# Patient Record
Sex: MALE | Race: BLACK OR AFRICAN AMERICAN | NOT HISPANIC OR LATINO | Employment: UNEMPLOYED | URBAN - METROPOLITAN AREA
[De-identification: names, ages, dates, MRNs, and addresses within clinical notes are randomized per-mention and may not be internally consistent; named-entity substitution may affect disease eponyms.]

---

## 2017-07-12 ENCOUNTER — HOSPITAL ENCOUNTER (EMERGENCY)
Facility: OTHER | Age: 42
Discharge: HOME | End: 2017-07-12
Attending: INTERNAL MEDICINE
Payer: MEDICARE

## 2017-07-12 VITALS
BODY MASS INDEX: 31.14 KG/M2 | OXYGEN SATURATION: 98 % | RESPIRATION RATE: 18 BRPM | TEMPERATURE: 99 F | WEIGHT: 235 LBS | DIASTOLIC BLOOD PRESSURE: 107 MMHG | SYSTOLIC BLOOD PRESSURE: 169 MMHG | HEART RATE: 63 BPM | HEIGHT: 73 IN

## 2017-07-12 DIAGNOSIS — M10.9 ACUTE GOUT INVOLVING TOE OF RIGHT FOOT, UNSPECIFIED CAUSE: Primary | ICD-10-CM

## 2017-07-12 PROCEDURE — 63600175 PHARM REV CODE 636 W HCPCS: Performed by: INTERNAL MEDICINE

## 2017-07-12 PROCEDURE — 99283 EMERGENCY DEPT VISIT LOW MDM: CPT

## 2017-07-12 RX ORDER — PREDNISONE 20 MG/1
40 TABLET ORAL DAILY
Qty: 10 TABLET | Refills: 0 | Status: SHIPPED | OUTPATIENT
Start: 2017-07-12 | End: 2017-07-17

## 2017-07-12 RX ORDER — PREDNISONE 20 MG/1
60 TABLET ORAL
Status: COMPLETED | OUTPATIENT
Start: 2017-07-12 | End: 2017-07-12

## 2017-07-12 RX ORDER — ALLOPURINOL 300 MG/1
300 TABLET ORAL DAILY
Qty: 30 TABLET | Refills: 0 | Status: SHIPPED | OUTPATIENT
Start: 2017-07-12 | End: 2021-05-18

## 2017-07-12 RX ORDER — COLCHICINE 0.6 MG/1
1.2 TABLET ORAL ONCE
Qty: 2 TABLET | Refills: 0 | Status: SHIPPED | OUTPATIENT
Start: 2017-07-12 | End: 2024-02-20

## 2017-07-12 RX ORDER — PREDNISONE 20 MG/1
40 TABLET ORAL DAILY
Qty: 10 TABLET | Refills: 0 | Status: SHIPPED | OUTPATIENT
Start: 2017-07-12 | End: 2017-07-12

## 2017-07-12 RX ORDER — HYDROCODONE BITARTRATE AND ACETAMINOPHEN 5; 325 MG/1; MG/1
2 TABLET ORAL EVERY 8 HOURS PRN
Qty: 14 TABLET | Refills: 0 | Status: SHIPPED | OUTPATIENT
Start: 2017-07-12 | End: 2021-05-18

## 2017-07-12 RX ADMIN — PREDNISONE 60 MG: 20 TABLET ORAL at 11:07

## 2017-07-12 NOTE — ED PROVIDER NOTES
Encounter Date: 7/12/2017       History     Chief Complaint   Patient presents with    Gout     right foot great toe     42-year-old male presents to the emergency department complaining of right great toe pain from a gout exacerbation ×3 days.  States he has taken colchicine without relief.      The history is provided by the patient. No  was used.   Foot Injury    The incident occurred at home. There was no injury mechanism. Illness onset: 3 days ago. Pain location: Right great toe. The quality of the pain is described as aching. The pain is at a severity of 6/10. The pain has been constant since onset. Pertinent negatives include no numbness, no inability to bear weight, no loss of motion, no muscle weakness, no loss of sensation and no tingling. He reports no foreign bodies present. The symptoms are aggravated by activity, bearing weight and palpation. Treatments tried: Colchicine. The treatment provided mild relief.     Review of patient's allergies indicates:  No Known Allergies  Past Medical History:   Diagnosis Date    Gout, chronic     High cholesterol     Hypertension      Past Surgical History:   Procedure Laterality Date    CIRCUMCISION      LAPAROSCOPIC GASTRIC BANDING      2011     Family History   Problem Relation Age of Onset    Hypertension Mother     Diabetes Mother     Hypertension Father      Social History   Substance Use Topics    Smoking status: Never Smoker    Smokeless tobacco: Never Used    Alcohol use No     Review of Systems   Respiratory: Negative.    Cardiovascular: Negative.    Musculoskeletal:        Toe pain   Neurological: Negative for tingling and numbness.   All other systems reviewed and are negative.      Physical Exam     Initial Vitals [07/12/17 1038]   BP Pulse Resp Temp SpO2   (!) 166/104 63 18 97.2 °F (36.2 °C) 100 %      MAP       124.67         Physical Exam    Nursing note and vitals reviewed.  Constitutional: He appears well-developed and  well-nourished.   HENT:   Head: Normocephalic and atraumatic.   Eyes: EOM are normal.   Neck: Normal range of motion.   Cardiovascular: Normal rate, regular rhythm and intact distal pulses.   Abdominal: He exhibits no distension.   Musculoskeletal:   Right great toe tenderness to palpation, pain upon movement, and increased warmth   Neurological: He is alert. He has normal strength.   Skin: Skin is warm and dry.   Psychiatric: He has a normal mood and affect.         ED Course   Procedures  Labs Reviewed - No data to display          Medical Decision Making:   Initial Assessment:   42-year-old male presents to the emergency department complaining of right great toe pain from a gout exacerbation ×3 days.  States he has taken colchicine without relief.  Differential Diagnosis:   Gout exacerbation  Toe sprain  ED Management:  Patient was given instructions for gout exacerbation as well as a prescription for a prednisone burst and Norco.  He was advised to follow with his primary care physician tomorrow for reevaluation.                   ED Course     Clinical Impression:   The primary diagnosis is right great toe gout  Disposition:   Disposition: Discharged  Condition: Stable                        Michael Koenig MD  07/12/17 5020

## 2017-07-12 NOTE — ED TRIAGE NOTES
Patient reports gout flare up for 2-3 days, right great toe swelling and pain reported.  Patient reported taking colchicine today

## 2017-07-12 NOTE — ED NOTES
Patient has verified the spelling of their name and  on armband    LOC: The patient is awake, alert, and aware of environment with an appropriate affect, the patient is oriented x 4 and speaking appropriately.     APPEARANCE: Patient resting comfortably and in no acute distress, patient is clean and well groomed, patient's clothing is properly fastened.     RESPIRATORY: Airway is open and patent, respirations are spontaneous, patient has a normal effort and rate, no accessory muscle use noted, bilateral breath sounds clear, denies SOB     CARDIAC:  No chest pain, chest pressure or chest discomfort. No palpitations.     SKIN: Erythema noted to right great toe.     MUSCULOSKELETAL: +Swelling and erythema noted to right great toes. Bilateral pulses palpable.     COMPLAINT: Patient complain of right foot pain x 2 days.

## 2018-07-04 ENCOUNTER — HOSPITAL ENCOUNTER (EMERGENCY)
Facility: HOSPITAL | Age: 43
Discharge: HOME | End: 2018-07-04
Attending: EMERGENCY MEDICINE
Payer: MEDICARE

## 2018-07-04 VITALS
HEIGHT: 72 IN | RESPIRATION RATE: 16 BRPM | BODY MASS INDEX: 32.51 KG/M2 | SYSTOLIC BLOOD PRESSURE: 187 MMHG | DIASTOLIC BLOOD PRESSURE: 117 MMHG | WEIGHT: 240 LBS | OXYGEN SATURATION: 100 % | TEMPERATURE: 98 F | HEART RATE: 70 BPM

## 2018-07-04 DIAGNOSIS — R21 RASH: Primary | ICD-10-CM

## 2018-07-04 DIAGNOSIS — I10 HYPERTENSION, UNSPECIFIED TYPE: ICD-10-CM

## 2018-07-04 PROCEDURE — 25000003 PHARM REV CODE 250: Performed by: NURSE PRACTITIONER

## 2018-07-04 PROCEDURE — 63600175 PHARM REV CODE 636 W HCPCS: Performed by: NURSE PRACTITIONER

## 2018-07-04 PROCEDURE — 99283 EMERGENCY DEPT VISIT LOW MDM: CPT | Mod: 25 | Performed by: NURSE PRACTITIONER

## 2018-07-04 PROCEDURE — 96372 THER/PROPH/DIAG INJ SC/IM: CPT | Performed by: NURSE PRACTITIONER

## 2018-07-04 RX ORDER — DIPHENHYDRAMINE HCL 25 MG
25 CAPSULE ORAL
Status: COMPLETED | OUTPATIENT
Start: 2018-07-04 | End: 2018-07-04

## 2018-07-04 RX ORDER — CARVEDILOL 3.12 MG/1
3.12 TABLET ORAL
Status: COMPLETED | OUTPATIENT
Start: 2018-07-04 | End: 2018-07-04

## 2018-07-04 RX ORDER — HYDROCORTISONE 25 MG/G
CREAM TOPICAL
Status: COMPLETED | OUTPATIENT
Start: 2018-07-04 | End: 2018-07-04

## 2018-07-04 RX ORDER — HYDROCORTISONE 25 MG/G
CREAM TOPICAL 2 TIMES DAILY
Qty: 3.5 G | Refills: 0 | Status: SHIPPED | OUTPATIENT
Start: 2018-07-04 | End: 2024-02-20

## 2018-07-04 RX ORDER — AMLODIPINE BESYLATE 10 MG/1
10 TABLET ORAL DAILY
COMMUNITY
End: 2024-02-20 | Stop reason: SDUPTHER

## 2018-07-04 RX ORDER — FAMOTIDINE 20 MG/1
20 TABLET, FILM COATED ORAL
Status: COMPLETED | OUTPATIENT
Start: 2018-07-04 | End: 2018-07-04

## 2018-07-04 RX ORDER — METHYLPREDNISOLONE SOD SUCC 125 MG
125 VIAL (EA) INJECTION
Status: COMPLETED | OUTPATIENT
Start: 2018-07-04 | End: 2018-07-04

## 2018-07-04 RX ORDER — CARVEDILOL 3.12 MG/1
3.12 TABLET ORAL 2 TIMES DAILY WITH MEALS
COMMUNITY
End: 2021-05-18

## 2018-07-04 RX ORDER — AMLODIPINE BESYLATE 5 MG/1
10 TABLET ORAL
Status: COMPLETED | OUTPATIENT
Start: 2018-07-04 | End: 2018-07-04

## 2018-07-04 RX ADMIN — FAMOTIDINE 20 MG: 20 TABLET ORAL at 06:07

## 2018-07-04 RX ADMIN — METHYLPREDNISOLONE SODIUM SUCCINATE 125 MG: 125 INJECTION, POWDER, FOR SOLUTION INTRAMUSCULAR; INTRAVENOUS at 06:07

## 2018-07-04 RX ADMIN — DIPHENHYDRAMINE HYDROCHLORIDE 25 MG: 25 CAPSULE ORAL at 06:07

## 2018-07-04 RX ADMIN — CARVEDILOL 3.12 MG: 3.12 TABLET, FILM COATED ORAL at 06:07

## 2018-07-04 RX ADMIN — HYDROCORTISONE: 25 CREAM TOPICAL at 07:07

## 2018-07-04 RX ADMIN — AMLODIPINE BESYLATE 10 MG: 5 TABLET ORAL at 06:07

## 2018-07-04 NOTE — ED PROVIDER NOTES
Encounter Date: 7/4/2018  SORT MSE:  Pt is a 43 y.o. male who presents for emergent consideration for rash. Pt will be moved to room when one is available, otherwise will wait in waiting room with triage nurse supervision.  Pt arrived by ambulatory. He is not in distress. Orders have been placed. BETHANY Milner DNP ACN-BC 7/4/2018 6:15 PM     SCRIBE #1 NOTE: I, Boubacar Medrano, am scribing for, and in the presence of,  Akila Nicolas NP. I have scribed the following portions of the note - Other sections scribed: HPI, ROS.       History     Chief Complaint   Patient presents with    Rash     neck, back and chest x 3-4 days. Patient states he has tried to take benadryl and apply cortisone cream without relief. Patient states he thinks he is allergic to peanuts so he went ahead and ate more peanuts to see if that is what caused him to break out.     CC: Rash    HPI: 42 y/o male with medical hx of chronic gout, high cholesterol, and HTN presents to the ED c/o x4 day hx of a rash. Pt at first thought it was poison ivy, and began taking benadryl and using Cortizone cream. Pt reports that the rash was spreading moving from his neck to his back, chest, then arms. Pt then believed it may have been due to eating cashews. Pt reports eating cashews again with worsening of rash. Pt denies itching but feels like his skin is dry. Pt denies SOB, palpitations, chest pain, N/V/D, light headedness, sick contact, or any other associated symptoms.       The history is provided by the patient. No  was used.     Review of patient's allergies indicates:  No Known Allergies  Past Medical History:   Diagnosis Date    Gout, chronic     High cholesterol     Hypertension      Past Surgical History:   Procedure Laterality Date    CIRCUMCISION      LAPAROSCOPIC GASTRIC BANDING      2011     Family History   Problem Relation Age of Onset    Hypertension Mother     Diabetes Mother     Hypertension Father      Social  History   Substance Use Topics    Smoking status: Never Smoker    Smokeless tobacco: Never Used    Alcohol use No     Review of Systems   Constitutional: Negative for chills and fever.   HENT: Negative for congestion, ear pain, rhinorrhea and sore throat.    Eyes: Negative for pain.   Respiratory: Negative for cough and shortness of breath.    Cardiovascular: Negative for chest pain and palpitations.   Gastrointestinal: Negative for abdominal pain, diarrhea, nausea and vomiting.   Genitourinary: Negative for dysuria, flank pain, frequency, hematuria and urgency.   Musculoskeletal: Negative for back pain and neck pain.   Skin: Positive for rash (difuse).   Neurological: Negative for dizziness, weakness, light-headedness, numbness and headaches.   All other systems reviewed and are negative.      Physical Exam     Initial Vitals [07/04/18 1816]   BP Pulse Resp Temp SpO2   (!) 210/126 74 16 98.5 °F (36.9 °C) 98 %      MAP       --         Physical Exam    Nursing note and vitals reviewed.  Constitutional: Vital signs are normal. He appears well-developed and well-nourished. He is not diaphoretic. He is cooperative.  Non-toxic appearance. He does not have a sickly appearance. He does not appear ill. No distress.   HENT:   Head: Normocephalic and atraumatic.   Right Ear: External ear normal.   Left Ear: External ear normal.   Nose: Nose normal.   Mouth/Throat: Oropharynx is clear and moist. No oropharyngeal exudate.   Eyes: Conjunctivae and EOM are normal. Pupils are equal, round, and reactive to light.   Neck: Normal range of motion and full passive range of motion without pain. Neck supple.   Cardiovascular: Normal rate, regular rhythm, normal heart sounds, intact distal pulses and normal pulses. Exam reveals no decreased pulses.    Pulmonary/Chest: Effort normal and breath sounds normal. No respiratory distress. He has no decreased breath sounds.   Abdominal: Soft. Bowel sounds are normal. There is no tenderness.  There is no rigidity, no rebound, no guarding and no CVA tenderness.   Musculoskeletal: Normal range of motion.   Lymphadenopathy:        Head (right side): No tonsillar adenopathy present.        Head (left side): No tonsillar adenopathy present.     He has no cervical adenopathy.   Neurological: He is alert and oriented to person, place, and time. He has normal strength. GCS eye subscore is 4. GCS verbal subscore is 5. GCS motor subscore is 6.   Skin: Skin is warm, dry and intact. Capillary refill takes less than 2 seconds. Rash noted.        Patient has small red raised papular rash to the left lateral neck, left upper chest, bilateral forearms.  Patient reports this is not urticarial at this time.  Does not appear to be an allergic reaction.  No evidence of hives or wheals.  No rashes noted to palm sore soles of feet.  No airway involvement, patient speaks in clear sentences with ease, oxygen saturation 100% on room air, patient is not tachypneic.  Patient is hypertensive however states he has not taken his blood pressure medication today.  Patient was administered his blood pressure medication in the emergency department however he declined to allow us to further treat him as he states he wants to go home for the holiday.  Patient is asymptomatic with this blood pressure.   Psychiatric: He has a normal mood and affect. His behavior is normal. Judgment and thought content normal.         ED Course   Procedures  Labs Reviewed - No data to display       Imaging Results    None                APC / Resident Notes:   This is an evaluation of a 43 y.o. male that presents to the Emergency Department for rash.  The patient is a non-toxic, afebrile, and well appearing male. On physical exam, there is a blanching, diffuse, dry, localized, papular and red rash, that blanches, . There are no oral mucosa lesions, lesions in the webspace's of the fingers or toes, lesions on the palms or soles, vesicular lesions,  desquamation, or sloughing of the skin. No herald patch or satellite lesions. It does not appear fungal.Patient has small red raised papular rash to the left lateral neck, left upper chest, bilateral forearms.  No airway involvement, patient speaks in clear sentences with ease, oxygen saturation 100% on room air, patient is not tachypneic.  Patient is hypertensive however states he has not taken his blood pressure medication today.  Patient was administered his blood pressure medication in the emergency department however he declined to allow us to further treat him as he states he wants to go home for the holiday.  Patient is asymptomatic with this blood pressure.  Vital Signs are Reassuring.       Given the above findings, my overall impression is rash. Given the above findings, I do not think the patients rash is a result of Hand, Foot, and Mouth, Scabies, Shingles, Chicken Pox, meningococcemia, TENs, or SJS.     ED Meds:  Hydrocortisone 2.5, methylprednisone, diphenhydramine, Pepcid, amlodipine, carvedilol. DC Meds:  Hydrocortisone Additional recommendations:  Continue to take Benadryl as needed, use hydrocortisone with sparingly, skin discoloration warning given follow up with her PCP.. The diagnosis, treatment plan, instructions for follow-up and reevaluation with PCP as well as ED return precautions have been discussed and an understanding has been verbalized. All questions or concerns from the patient have been addressed.     This case was discussed with and the patient has been examined by Dr. quevedo who is in agreement with my assessment and plan.           Scribe Attestation:   Scribe #1: I performed the above scribed service and the documentation accurately describes the services I performed. I attest to the accuracy of the note.    Attending Attestation:           Physician Attestation for Scribe:  Physician Attestation Statement for Scribe #1: I, Akila Nicolas NP, reviewed documentation, as scribed  by Boubacar Medrano in my presence, and it is both accurate and complete.                    Clinical Impression:   The primary encounter diagnosis was Rash. A diagnosis of Hypertension, unspecified type was also pertinent to this visit.      Disposition:   Disposition: Discharged  Condition: Stable                        Akila Nicolas NP  07/04/18 1952

## 2018-07-04 NOTE — ED TRIAGE NOTES
Pt c/o rash x4 days. Rash is on neck, chest, back, bilat arms. Denies rash below waist. Pt states he believes he may be allergic to peanuts or cashews. States he ate some 4 days ago when the rash first appeared and then ate some again today and noticed the rash spread. Denies SOB or difficulty breathing. Pt has been taking benedryl and applying cortisone cream to no relief. Denies taking meds PTA

## 2018-07-05 NOTE — DISCHARGE INSTRUCTIONS
You have been given an appointment caught hydrocortisone.  You you need to use this on a rash 2 times daily.  Please do not exceed using this for more than 3 days.  Please do not use this on your face as this will cause discoloration of the skin.   You will need to follow up with her primary care physician as soon as possible for continued care and management.  You may also continue to take Benadryl every 6 hr as needed for itching.  Please remember to take your blood pressure medicine daily.  Please keep a log of her blood pressures at home to take to her primary care physician.    Please return to the Emergency Department for any new or worsening symptoms including: fever, chest pain, shortness of breath, loss of consciousness, dizziness, weakness, or any other concerns.     Please follow up with your Primary Care Provider within in the week. If you do not have one, you may contact the one listed on your discharge paperwork or you may also call the [facility] Appointment Desk at [phone] to schedule an appointment with one.     Please take all medication as prescribed.

## 2019-05-05 ENCOUNTER — HOSPITAL ENCOUNTER (EMERGENCY)
Facility: HOSPITAL | Age: 44
Discharge: HOME | End: 2019-05-05
Attending: EMERGENCY MEDICINE
Payer: MEDICARE

## 2019-05-05 VITALS
OXYGEN SATURATION: 98 % | RESPIRATION RATE: 18 BRPM | DIASTOLIC BLOOD PRESSURE: 71 MMHG | HEIGHT: 72 IN | SYSTOLIC BLOOD PRESSURE: 130 MMHG | WEIGHT: 240 LBS | TEMPERATURE: 99 F | BODY MASS INDEX: 32.51 KG/M2 | HEART RATE: 81 BPM

## 2019-05-05 DIAGNOSIS — R51.9 ACUTE NONINTRACTABLE HEADACHE, UNSPECIFIED HEADACHE TYPE: ICD-10-CM

## 2019-05-05 DIAGNOSIS — J01.90 ACUTE SINUSITIS, RECURRENCE NOT SPECIFIED, UNSPECIFIED LOCATION: Primary | ICD-10-CM

## 2019-05-05 DIAGNOSIS — H92.01 RIGHT EAR PAIN: ICD-10-CM

## 2019-05-05 DIAGNOSIS — H65.91 RIGHT OTITIS MEDIA WITH EFFUSION: ICD-10-CM

## 2019-05-05 DIAGNOSIS — J02.9 PHARYNGITIS, UNSPECIFIED ETIOLOGY: ICD-10-CM

## 2019-05-05 LAB — DEPRECATED S PYO AG THROAT QL EIA: NEGATIVE

## 2019-05-05 PROCEDURE — 99284 EMERGENCY DEPT VISIT MOD MDM: CPT

## 2019-05-05 PROCEDURE — 87880 STREP A ASSAY W/OPTIC: CPT

## 2019-05-05 PROCEDURE — 25000003 PHARM REV CODE 250: Performed by: NURSE PRACTITIONER

## 2019-05-05 PROCEDURE — 87081 CULTURE SCREEN ONLY: CPT

## 2019-05-05 RX ORDER — FLUTICASONE PROPIONATE 50 MCG
1 SPRAY, SUSPENSION (ML) NASAL 2 TIMES DAILY PRN
Qty: 15 G | Refills: 0 | Status: SHIPPED | OUTPATIENT
Start: 2019-05-05 | End: 2021-05-18

## 2019-05-05 RX ORDER — ACETAMINOPHEN 325 MG/1
650 TABLET ORAL
Status: COMPLETED | OUTPATIENT
Start: 2019-05-05 | End: 2019-05-05

## 2019-05-05 RX ORDER — LORATADINE 10 MG/1
10 TABLET ORAL DAILY
Qty: 14 TABLET | Refills: 0 | Status: SHIPPED | OUTPATIENT
Start: 2019-05-05 | End: 2024-02-20

## 2019-05-05 RX ADMIN — LIDOCAINE HYDROCHLORIDE 15 ML: 20 SOLUTION ORAL; TOPICAL at 07:05

## 2019-05-05 RX ADMIN — ACETAMINOPHEN 650 MG: 325 TABLET, FILM COATED ORAL at 07:05

## 2019-05-06 NOTE — DISCHARGE INSTRUCTIONS
Please return to the Emergency Department for any new or worsening symptoms including: worsening pain in the ear, head or throat;  fever, chest pain, shortness of breath, loss of consciousness, dizziness, weakness, or any other concerns.     Please follow up with your Primary Care Provider within in the week. If you do not have one, you may contact the one listed on your discharge paperwork or you may also call the [facility] Appointment Desk at [phone] to schedule an appointment with one.     Please take all medication as prescribed.

## 2019-05-06 NOTE — ED PROVIDER NOTES
Encounter Date: 5/5/2019       History     Chief Complaint   Patient presents with    Otalgia     right ear pain, throat pain, and HA x 3 DAYS; denies n/v/d, fever or chills at this time; took amoxicillin last night; denies taking anything for pain    Sore Throat     CC: Right Ear Pain, Sore Throat, Headache    HPI: Temo Braney, a 43 y.o. male presents to the ED gradual onset of right ear pain, sore throat, and right-sided headache that has been ongoing for last 2-3 days.  He reports occasional episodes of congestion.  He reports occasional nonproductive cough.  He reports no fevers, difficulty breathing or difficulty swallowing. He attempted treatment with an amoxicillin yesterday with no changes in his symptoms. Reports no vision changes, weakness, numbness, or other issue.          The history is provided by the patient. No  was used.     Review of patient's allergies indicates:  No Known Allergies  Past Medical History:   Diagnosis Date    Gout, chronic     High cholesterol     Hypertension      Past Surgical History:   Procedure Laterality Date    CIRCUMCISION      ESOPHAGOGASTRODUODENOSCOPY (EGD) N/A 11/21/2016    Performed by Moe [facility]enport [facility] at [facility]    LAPAROSCOPIC GASTRIC BANDING      2011     Family History   Problem Relation Age of Onset    Hypertension Mother     Diabetes Mother     Hypertension Father      Social History     Tobacco Use    Smoking status: Never Smoker    Smokeless tobacco: Never Used   Substance Use Topics    Alcohol use: No    Drug use: No     Review of Systems   Constitutional: Negative for fever.   HENT: Positive for congestion, ear pain (Right), rhinorrhea and sore throat. Negative for trouble swallowing.    Respiratory: Positive for cough (Intermittent).    Gastrointestinal: Negative for abdominal pain and vomiting.   Musculoskeletal: Negative for back pain, neck pain and neck stiffness.   Skin: Negative for rash and wound.    Neurological: Positive for headaches (Right Frontal). Negative for seizures, syncope, weakness and numbness.   Psychiatric/Behavioral: Negative for confusion.       Physical Exam     Initial Vitals [05/05/19 1856]   BP Pulse Resp Temp SpO2   131/79 82 17 99.2 °F (37.3 °C) 98 %      MAP       --         Physical Exam    Nursing note and vitals reviewed.  Constitutional: He appears well-developed and well-nourished. He is not diaphoretic. He is cooperative.  Non-toxic appearance. He does not have a sickly appearance. He does not appear ill. No distress.   HENT:   Head: Normocephalic and atraumatic.   Right Ear: External ear normal. Tympanic membrane is not erythematous. A middle ear effusion is present.   Left Ear: External ear normal. Tympanic membrane is not erythematous.  No middle ear effusion.   Nose: Mucosal edema and rhinorrhea present.   Mouth/Throat: Uvula is midline and mucous membranes are normal. No trismus in the jaw. Posterior oropharyngeal erythema (mild) present. No oropharyngeal exudate, posterior oropharyngeal edema or tonsillar abscesses.   Rhinorrhea with boggy nasal mucosa and posterior pharyngeal cobblestoning.   Eyes: Conjunctivae and EOM are normal.   Neck: Trachea normal, normal range of motion and phonation normal. Neck supple. Normal range of motion present. No neck rigidity.   Cardiovascular: Normal rate and regular rhythm.   Pulses:       Radial pulses are 2+ on the right side, and 2+ on the left side.   Pulmonary/Chest: Effort normal. No tachypnea and no bradypnea. No respiratory distress. He has no wheezes. He has no rhonchi. He has no rales.   Musculoskeletal: Normal range of motion.   Lymphadenopathy:     He has no cervical adenopathy.        Right cervical: No superficial cervical adenopathy present.       Left cervical: No superficial cervical adenopathy present.   Neurological: He is alert and oriented to person, place, and time. He has normal strength. Coordination and gait  normal. GCS eye subscore is 4. GCS verbal subscore is 5. GCS motor subscore is 6.   Skin: Skin is warm, dry and intact. No bruising and no rash noted. No erythema.         ED Course   Procedures  Labs Reviewed   THROAT SCREEN, RAPID   CULTURE, STREP A,  THROAT          Imaging Results    None                APC / Resident Notes:   This is an evaluation of a 43 y.o. male that presents to the Emergency Department for right ear pain, sore throat, and right sided frontal headache. Reports headache is gradual onset. Attempted treatment with 1 amoxil left over without improvement. The patient is a non-toxic, afebrile, and well appearing male. On physical exam ears are without evidence of infection, there is a middle ear effusion of the right ear. Mild pharyngeal erythema without exudates.  Appears well hydrated with moist mucus membranes. Neck soft and supple with no meningeal signs or cervical lymphadenopathy. Breath sounds are clear and equal bilaterally with no adventitious breath sounds, tachypnea or respiratory distress with room air pulse ox of 98% and no evidence of hypoxia. Rhinorrhea with boggy mucosa. Neuro intact. Vital Signs Are Reassuring. RESULTS: Strep negative.     My overall impression is Sinusitis with right OME and pharyngitis - given the length of symptoms and lack of fever, likely viral in nature. I considered, but at this time, do not suspect OM, OE, strep pharyngitis, meningitis, pneumonia, or acute bacterial sinusitis.    D/C Meds: Flonase, Claritin. Additional D/C Information: Hydration, Tylenol PRN. The diagnosis, treatment plan, instructions for follow-up and reevaluation with PCP as well as ED return precautions were discussed and understanding was verbalized. All questions or concerns have been addressed. MIRANDA Covarrubias, FNP-C                  Clinical Impression:       ICD-10-CM ICD-9-CM   1. Acute sinusitis, recurrence not specified, unspecified location J01.90 461.9   2. Right ear pain  H92.01 388.70   3. Pharyngitis, unspecified etiology J02.9 462   4. Acute nonintractable headache, unspecified headache type R51 784.0   5. Right otitis media with effusion H65.91 381.4         Disposition:   Disposition: Discharged  Condition: Stable                        Sagar Mcmahan, [facility]  05/05/19 9060

## 2019-05-07 LAB — BACTERIA THROAT CULT: NORMAL

## 2019-09-30 ENCOUNTER — HOSPITAL ENCOUNTER (EMERGENCY)
Facility: HOSPITAL | Age: 44
Discharge: HOME | End: 2019-09-30
Attending: EMERGENCY MEDICINE
Payer: MEDICARE

## 2019-09-30 VITALS
HEART RATE: 89 BPM | OXYGEN SATURATION: 98 % | DIASTOLIC BLOOD PRESSURE: 115 MMHG | TEMPERATURE: 98 F | BODY MASS INDEX: 32.51 KG/M2 | RESPIRATION RATE: 16 BRPM | WEIGHT: 240 LBS | HEIGHT: 72 IN | SYSTOLIC BLOOD PRESSURE: 204 MMHG

## 2019-09-30 DIAGNOSIS — M79.5 FOREIGN BODY (FB) IN SOFT TISSUE: ICD-10-CM

## 2019-09-30 DIAGNOSIS — S01.81XA FACIAL LACERATION, INITIAL ENCOUNTER: Primary | ICD-10-CM

## 2019-09-30 PROCEDURE — 25000003 PHARM REV CODE 250: Performed by: NURSE PRACTITIONER

## 2019-09-30 PROCEDURE — 12051 INTMD RPR FACE/MM 2.5 CM/<: CPT

## 2019-09-30 PROCEDURE — 96360 HYDRATION IV INFUSION INIT: CPT

## 2019-09-30 PROCEDURE — 99284 EMERGENCY DEPT VISIT MOD MDM: CPT | Mod: 25

## 2019-09-30 PROCEDURE — 63600175 PHARM REV CODE 636 W HCPCS: Performed by: EMERGENCY MEDICINE

## 2019-09-30 RX ORDER — LIDOCAINE HYDROCHLORIDE AND EPINEPHRINE 10; 10 MG/ML; UG/ML
10 INJECTION, SOLUTION INFILTRATION; PERINEURAL
Status: COMPLETED | OUTPATIENT
Start: 2019-09-30 | End: 2019-09-30

## 2019-09-30 RX ORDER — CEPHALEXIN 500 MG/1
500 CAPSULE ORAL 4 TIMES DAILY
Qty: 20 CAPSULE | Refills: 0 | Status: SHIPPED | OUTPATIENT
Start: 2019-09-30 | End: 2019-10-05

## 2019-09-30 RX ADMIN — LIDOCAINE HYDROCHLORIDE AND EPINEPHRINE 10 ML: 10; 10 INJECTION, SOLUTION INFILTRATION; PERINEURAL at 12:09

## 2019-09-30 RX ADMIN — SODIUM CHLORIDE 2000 ML: 0.9 INJECTION, SOLUTION INTRAVENOUS at 12:09
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
## 2019-09-30 NOTE — DISCHARGE INSTRUCTIONS
Thank you for coming to our Emergency Department today. It is important to remember that some problems are difficult to diagnose and may not be found during your first visit. Be sure to follow up with your primary care doctor and review any labs/imaging that was performed with them. If you do not have a primary care doctor, you may contact the one listed on your discharge paperwork or you may also call the [facility] Appointment Desk at [phone] to schedule an appointment with one.     All medications may potentially have side effects and it is impossible to predict which medications may give you side effects. If you feel that you are having a negative effect of any medication you should immediately stop taking them and seek medical attention.    Return to the ER with any questions/concerns, new/concerning symptoms, worsening or failure to improve. Do not drive or make any important decisions for 24 hours if you have received any pain medications, sedatives or mood altering drugs during your ER visit.
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
## 2022-09-02 ENCOUNTER — HOSPITAL ENCOUNTER (EMERGENCY)
Facility: HOSPITAL | Age: 47
Discharge: HOME | End: 2022-09-02
Attending: EMERGENCY MEDICINE
Payer: MEDICARE

## 2022-09-02 VITALS
TEMPERATURE: 98 F | HEIGHT: 73 IN | DIASTOLIC BLOOD PRESSURE: 96 MMHG | WEIGHT: 245 LBS | BODY MASS INDEX: 32.47 KG/M2 | SYSTOLIC BLOOD PRESSURE: 154 MMHG | RESPIRATION RATE: 16 BRPM | OXYGEN SATURATION: 99 % | HEART RATE: 63 BPM

## 2022-09-02 DIAGNOSIS — M43.6 TORTICOLLIS, ACUTE: Primary | ICD-10-CM

## 2022-09-02 PROCEDURE — 99284 EMERGENCY DEPT VISIT MOD MDM: CPT

## 2022-09-02 PROCEDURE — 63600175 PHARM REV CODE 636 W HCPCS: Performed by: EMERGENCY MEDICINE

## 2022-09-02 PROCEDURE — 96372 THER/PROPH/DIAG INJ SC/IM: CPT | Performed by: EMERGENCY MEDICINE

## 2022-09-02 RX ORDER — DEXAMETHASONE SODIUM PHOSPHATE 4 MG/ML
8 INJECTION, SOLUTION INTRA-ARTICULAR; INTRALESIONAL; INTRAMUSCULAR; INTRAVENOUS; SOFT TISSUE
Status: COMPLETED | OUTPATIENT
Start: 2022-09-02 | End: 2022-09-02

## 2022-09-02 RX ORDER — HYDROMORPHONE HYDROCHLORIDE 1 MG/ML
1 INJECTION, SOLUTION INTRAMUSCULAR; INTRAVENOUS; SUBCUTANEOUS
Status: COMPLETED | OUTPATIENT
Start: 2022-09-02 | End: 2022-09-02

## 2022-09-02 RX ORDER — METHOCARBAMOL 500 MG/1
1000 TABLET, FILM COATED ORAL 3 TIMES DAILY
Qty: 30 TABLET | Refills: 0 | Status: SHIPPED | OUTPATIENT
Start: 2022-09-02 | End: 2022-09-07

## 2022-09-02 RX ORDER — HYDROMORPHONE HYDROCHLORIDE 1 MG/ML
2 INJECTION, SOLUTION INTRAMUSCULAR; INTRAVENOUS; SUBCUTANEOUS
Status: DISCONTINUED | OUTPATIENT
Start: 2022-09-02 | End: 2022-09-02

## 2022-09-02 RX ORDER — ORPHENADRINE CITRATE 30 MG/ML
60 INJECTION INTRAMUSCULAR; INTRAVENOUS
Status: COMPLETED | OUTPATIENT
Start: 2022-09-02 | End: 2022-09-02

## 2022-09-02 RX ORDER — NAPROXEN 500 MG/1
500 TABLET ORAL 2 TIMES DAILY WITH MEALS
Qty: 30 TABLET | Refills: 0 | Status: SHIPPED | OUTPATIENT
Start: 2022-09-02 | End: 2024-02-20

## 2022-09-02 RX ADMIN — ORPHENADRINE CITRATE 60 MG: 60 INJECTION INTRAMUSCULAR; INTRAVENOUS at 03:09

## 2022-09-02 RX ADMIN — HYDROMORPHONE HYDROCHLORIDE 1 MG: 1 INJECTION, SOLUTION INTRAMUSCULAR; INTRAVENOUS; SUBCUTANEOUS at 03:09

## 2022-09-02 RX ADMIN — DEXAMETHASONE SODIUM PHOSPHATE 8 MG: 4 INJECTION, SOLUTION INTRA-ARTICULAR; INTRALESIONAL; INTRAMUSCULAR; INTRAVENOUS; SOFT TISSUE at 03:09

## 2022-09-02 NOTE — ED PROVIDER NOTES
Encounter Date: 9/2/2022       History     Chief Complaint   Patient presents with    Torticollis     X 24 hrs, Rt side     47-year-old male presented emergency department with pain in the right side of the neck and having pain when he tries to turn the head to the right.  Patient woke up with this pain yesterday.  Denies any fever or headache or nausea vomiting.  Denies any trauma.  Denies any midline neck tenderness    Review of patient's allergies indicates:  No Known Allergies  Past Medical History:   Diagnosis Date    Gout, chronic     High cholesterol     Hypertension      Past Surgical History:   Procedure Laterality Date    CIRCUMCISION      LAPAROSCOPIC GASTRIC BANDING      2011     Family History   Problem Relation Age of Onset    Hypertension Mother     Diabetes Mother     Hypertension Father      Social History     Tobacco Use    Smoking status: Never    Smokeless tobacco: Never   Substance Use Topics    Alcohol use: No    Drug use: No     Review of Systems   Constitutional: Negative.    HENT: Negative.     Eyes: Negative.    Respiratory: Negative.     Cardiovascular: Negative.    Gastrointestinal: Negative.    Endocrine: Negative.    Genitourinary: Negative.    Musculoskeletal:  Positive for neck pain.   Skin: Negative.    Allergic/Immunologic: Negative.    Neurological: Negative.    Hematological: Negative.    Psychiatric/Behavioral: Negative.     All other systems reviewed and are negative.    Physical Exam     Initial Vitals [09/02/22 0255]   BP Pulse Resp Temp SpO2   (!) 149/109 65 15 97.5 °F (36.4 °C) 99 %      MAP       --         Physical Exam    Nursing note and vitals reviewed.  Constitutional: He appears well-developed and well-nourished.   HENT:   Head: Normocephalic and atraumatic.   Nose: Nose normal.   Eyes: Conjunctivae and EOM are normal.   Neck: No tracheal deviation present.   Normal range of motion.  Cardiovascular:  Normal rate, regular rhythm, normal heart sounds and  intact distal pulses.     Exam reveals no friction rub.       No murmur heard.  Pulmonary/Chest: Breath sounds normal. No respiratory distress. He has no wheezes. He has no rales.   Abdominal: Abdomen is soft. He exhibits no distension. There is no abdominal tenderness.   Musculoskeletal:         General: Tenderness present. Normal range of motion.      Cervical back: Normal range of motion.      Comments: Right sternocleidomastoids muscle tenderness and stiffness.  No midline cervical spine tenderness     Neurological: He is alert and oriented to person, place, and time. He has normal strength. GCS score is 15. GCS eye subscore is 4. GCS verbal subscore is 5. GCS motor subscore is 6.   Skin: Skin is warm and dry. Capillary refill takes less than 2 seconds.   Psychiatric: He has a normal mood and affect. Thought content normal.       ED Course   Procedures  Labs Reviewed - No data to display       Imaging Results    None          Medications   HYDROmorphone injection 2 mg (has no administration in time range)   orphenadrine injection 60 mg (has no administration in time range)   dexamethasone injection 8 mg (has no administration in time range)     Medical Decision Making:   Differential Diagnosis:   Patient has neck stiffness consistent with torticollis which started when he woke from sleep yesterday.  Patient's pain musculoskeletal in nature and treated in the emergency department and discharged with instructions and follow-up.  Patient otherwise neurologically intact                    Clinical Impression:   Final diagnoses:  [M43.6] Torticollis, acute (Primary)        ED Disposition Condition    Discharge Stable          ED Prescriptions       Medication Sig Dispense Start Date End Date Auth. Provider    naproxen (NAPROSYN) 500 MG tablet Take 1 tablet (500 mg total) by mouth 2 (two) times daily with meals. 30 tablet 9/2/2022 -- Veronica Pascual MD    methocarbamoL (ROBAXIN) 500 MG Tab Take 2 tablets (1,000 mg total)  by mouth 3 (three) times daily. for 5 days 30 tablet 9/2/2022 9/7/2022 Veronica Pascual MD          Follow-up Information       Follow up With Specialties Details Why Contact Info    El Galvan MD Internal Medicine In 2 days  [address] [facility] [place] [address]  [phone]               Veronica Pascual MD  09/02/22 4637

## 2022-09-02 NOTE — DISCHARGE INSTRUCTIONS
No bending or stooping or heavy weightlifting.  Rest.  Return to emergency department for worsening symptoms or any problems

## 2023-01-01 ENCOUNTER — HOSPITAL ENCOUNTER (EMERGENCY)
Facility: HOSPITAL | Age: 48
Discharge: HOME | End: 2023-01-01
Attending: EMERGENCY MEDICINE
Payer: MEDICARE

## 2023-01-01 VITALS
OXYGEN SATURATION: 100 % | DIASTOLIC BLOOD PRESSURE: 94 MMHG | HEART RATE: 70 BPM | TEMPERATURE: 98 F | HEIGHT: 73 IN | RESPIRATION RATE: 18 BRPM | BODY MASS INDEX: 33.13 KG/M2 | SYSTOLIC BLOOD PRESSURE: 147 MMHG | WEIGHT: 250 LBS

## 2023-01-01 DIAGNOSIS — R52 PAIN: ICD-10-CM

## 2023-01-01 DIAGNOSIS — S80.02XA CONTUSION OF LEFT KNEE, INITIAL ENCOUNTER: ICD-10-CM

## 2023-01-01 DIAGNOSIS — S20.211A CONTUSION OF RIB ON RIGHT SIDE, INITIAL ENCOUNTER: ICD-10-CM

## 2023-01-01 DIAGNOSIS — S90.02XA CONTUSION OF LEFT ANKLE, INITIAL ENCOUNTER: Primary | ICD-10-CM
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
## 2023-01-01 LAB
ALBUMIN SERPL BCP-MCNC: 4.3 G/DL (ref 3.5–5.2)
ALP SERPL-CCNC: 57 U/L (ref 55–135)
ALT SERPL W/O P-5'-P-CCNC: 28 U/L (ref 10–44)
ANION GAP SERPL CALC-SCNC: 5 MMOL/L (ref 8–16)
AST SERPL-CCNC: 25 U/L (ref 10–40)
BASOPHILS # BLD AUTO: 0.05 K/UL (ref 0–0.2)
BASOPHILS NFR BLD: 0.6 % (ref 0–1.9)
BILIRUB SERPL-MCNC: 0.7 MG/DL (ref 0.1–1)
BUN SERPL-MCNC: 24 MG/DL (ref 6–20)
CALCIUM SERPL-MCNC: 9 MG/DL (ref 8.7–10.5)
CHLORIDE SERPL-SCNC: 104 MMOL/L (ref 95–110)
CO2 SERPL-SCNC: 28 MMOL/L (ref 23–29)
CREAT SERPL-MCNC: 1.8 MG/DL (ref 0.5–1.4)
CREAT SERPL-MCNC: 2 MG/DL (ref 0.5–1.4)
DIFFERENTIAL METHOD: ABNORMAL
EOSINOPHIL # BLD AUTO: 0.2 K/UL (ref 0–0.5)
EOSINOPHIL NFR BLD: 2.7 % (ref 0–8)
ERYTHROCYTE [DISTWIDTH] IN BLOOD BY AUTOMATED COUNT: 14.9 % (ref 11.5–14.5)
EST. GFR  (NO RACE VARIABLE): 46.7 ML/MIN/1.73 M^2
GLUCOSE SERPL-MCNC: 87 MG/DL (ref 70–110)
HCT VFR BLD AUTO: 38.4 % (ref 40–54)
HGB BLD-MCNC: 13 G/DL (ref 14–18)
IMM GRANULOCYTES # BLD AUTO: 0.03 K/UL (ref 0–0.04)
IMM GRANULOCYTES NFR BLD AUTO: 0.3 % (ref 0–0.5)
LYMPHOCYTES # BLD AUTO: 1.7 K/UL (ref 1–4.8)
LYMPHOCYTES NFR BLD: 18.8 % (ref 18–48)
MCH RBC QN AUTO: 26.3 PG (ref 27–31)
MCHC RBC AUTO-ENTMCNC: 33.9 G/DL (ref 32–36)
MCV RBC AUTO: 78 FL (ref 82–98)
MONOCYTES # BLD AUTO: 0.6 K/UL (ref 0.3–1)
MONOCYTES NFR BLD: 6.1 % (ref 4–15)
NEUTROPHILS # BLD AUTO: 6.5 K/UL (ref 1.8–7.7)
NEUTROPHILS NFR BLD: 71.5 % (ref 38–73)
NRBC BLD-RTO: 0 /100 WBC
PLATELET # BLD AUTO: 274 K/UL (ref 150–450)
PMV BLD AUTO: 9.7 FL (ref 9.2–12.9)
POTASSIUM SERPL-SCNC: 3.8 MMOL/L (ref 3.5–5.1)
PROT SERPL-MCNC: 7.8 G/DL (ref 6–8.4)
RBC # BLD AUTO: 4.95 M/UL (ref 4.6–6.2)
SAMPLE: ABNORMAL
SODIUM SERPL-SCNC: 137 MMOL/L (ref 136–145)
WBC # BLD AUTO: 9.03 K/UL (ref 3.9–12.7)

## 2023-01-01 PROCEDURE — 85025 COMPLETE CBC W/AUTO DIFF WBC: CPT | Performed by: NURSE PRACTITIONER

## 2023-01-01 PROCEDURE — 96374 THER/PROPH/DIAG INJ IV PUSH: CPT

## 2023-01-01 PROCEDURE — 80053 COMPREHEN METABOLIC PANEL: CPT | Performed by: NURSE PRACTITIONER

## 2023-01-01 PROCEDURE — 99285 EMERGENCY DEPT VISIT HI MDM: CPT | Mod: 25

## 2023-01-01 PROCEDURE — 63600175 PHARM REV CODE 636 W HCPCS: Performed by: NURSE PRACTITIONER

## 2023-01-01 RX ORDER — MORPHINE SULFATE 4 MG/ML
4 INJECTION, SOLUTION INTRAMUSCULAR; INTRAVENOUS
Status: COMPLETED | OUTPATIENT
Start: 2023-01-01 | End: 2023-01-01

## 2023-01-01 RX ORDER — METHOCARBAMOL 500 MG/1
1000 TABLET, FILM COATED ORAL EVERY 8 HOURS PRN
Qty: 30 TABLET | Refills: 0 | Status: SHIPPED | OUTPATIENT
Start: 2023-01-01 | End: 2023-01-06

## 2023-01-01 RX ADMIN — MORPHINE SULFATE 4 MG: 4 INJECTION, SOLUTION INTRAMUSCULAR; INTRAVENOUS at 08:01

## 2023-01-02 NOTE — ED PROVIDER NOTES
Encounter Date: 1/1/2023       History     Chief Complaint   Patient presents with    Ankle Pain     LEFT, FELL THRU CEILING IN ATTIC. NO LOC    Knee Injury     LEFT    Hip Pain     LEFT    RT RIBS     RT SIDE RIBS     Temo Barney is a 47 year old male with pmh gout, HTN, high cholesterol presenting to the ED with c/o left knee and ankle pain, right anterior chest wall pain, and left hip pain. The patient states he was working in the attic when he stepped in an area that caused him to fall through the ceiling. He did not fall down to the ground but the left leg went through the ceiling. He struck his right chest and abdomen on a beam. He did not hit his head or have LOC.     Review of patient's allergies indicates:  No Known Allergies  Past Medical History:   Diagnosis Date    Gout, chronic     High cholesterol     Hypertension      Past Surgical History:   Procedure Laterality Date    CIRCUMCISION      LAPAROSCOPIC GASTRIC BANDING      2011     Family History   Problem Relation Age of Onset    Hypertension Mother     Diabetes Mother     Hypertension Father      Social History     Tobacco Use    Smoking status: Never    Smokeless tobacco: Never   Substance Use Topics    Alcohol use: No    Drug use: No     Review of Systems   Constitutional:  Negative for fever.   HENT:  Negative for sore throat.    Respiratory:  Negative for shortness of breath.    Cardiovascular:  Positive for chest pain (right chest wall pain).   Gastrointestinal:  Positive for abdominal pain. Negative for diarrhea, nausea and vomiting.   Genitourinary:  Negative for dysuria.   Musculoskeletal:  Positive for arthralgias (left knee, left ankle, left hip). Negative for back pain.   Skin:  Negative for rash.   Neurological:  Negative for weakness and headaches.   Hematological:  Does not bruise/bleed easily.     Physical Exam     Initial Vitals [01/01/23 1811]   BP Pulse Resp Temp SpO2   (!) 164/111 70 18 98.3 °F (36.8 °C) 100 %      MAP       --          Physical Exam    Nursing note and vitals reviewed.  Constitutional: He appears well-developed and well-nourished. He is not diaphoretic. No distress.   HENT:   Head: Normocephalic and atraumatic.   Mouth/Throat: Oropharynx is clear and moist.   Eyes: Conjunctivae are normal.   Neck: Neck supple.   Cardiovascular:  Normal rate, regular rhythm, normal heart sounds and intact distal pulses.     Exam reveals no gallop and no friction rub.       No murmur heard.  Pulmonary/Chest: Breath sounds normal. He has no wheezes. He has no rhonchi. He has no rales. He exhibits tenderness (right lateral chest wall).     Abdominal: Abdomen is soft. He exhibits no distension. There is abdominal tenderness (RUQ).   Musculoskeletal:      Cervical back: Neck supple.      Left hip: Tenderness present.      Left knee: Bony tenderness present. No deformity. Decreased range of motion. Normal pulse.      Left ankle: Swelling present. No deformity. Tenderness present over the lateral malleolus. Decreased range of motion (due to pain). Normal pulse.     Neurological: He is alert and oriented to person, place, and time.   Skin: No rash noted. No erythema.       ED Course   Procedures  Labs Reviewed   CBC W/ AUTO DIFFERENTIAL - Abnormal; Notable for the following components:       Result Value    Hemoglobin 13.0 (*)     Hematocrit 38.4 (*)     MCV 78 (*)     MCH 26.3 (*)     RDW 14.9 (*)     All other components within normal limits   COMPREHENSIVE METABOLIC PANEL - Abnormal; Notable for the following components:    BUN 24 (*)     Creatinine 1.8 (*)     Anion Gap 5 (*)     eGFR 46.7 (*)     All other components within normal limits   ISTAT CREATININE - Abnormal; Notable for the following components:    POC Creatinine 2.0 (*)     All other components within normal limits   POCT CREATININE          Imaging Results              CT Chest Abdomen Pelvis Without Contrast (XPD) (Final result)  Result time 01/01/23 22:10:09   Procedure changed  from CT Chest Abdomen Pelvis With Contrast (xpd)     Final result by Colten Del Toro MD (01/01/23 22:10:09)                   Narrative:    EXAM DESCRIPTION: CT CHEST ABDOMEN PELVIS WITHOUT CONTRAST(XPD)    CLINICAL INDICATION:  Polytrauma, blunt    TECHNIQUE: Axial CT imaging of the chest, abdomen and pelvis was performed. Sagittal and coronal reconstructions were performed for review.    CONTRAST: None    COMPARISON: Chest x-ray January 1, 2023.    FINDINGS:  CT chest: The esophagus is distended with fluid. This could represent chronic esophageal disease or be related to obstruction from the gastric band.    There are no pleural or pericardial effusions. Mild increased density in the anterior mediastinum is most consistent with residual thymus. No evidence of acute mediastinal injury.      The heart size is normal.    The lungs are clear and there is no pneumothorax.    No fracture is demonstrated.        CT abdomen/pelvis: Gastric band is present and grossly normal in position. No bowel injury demonstrated. Scattered diverticuli are present. The appendix is normal.    The liver, gallbladder and bile ducts, spleen, adrenal glands and pancreas are normal.    The kidneys are normal in appearance.    Aorta is mildly atherosclerotic and normal size.    No free fluid, masses or adenopathy.    The prostate gland is normal size. The bladder is normal in appearance.    There is bruising over the right abdominal wall.    No fractures are demonstrated.      IMPRESSION:  1: Bruising of the anterior abdominal wall consistent with injury.  2: Distended esophagus with fluid. Possibly related to the tightness of the gastric band.  3: No acute traumatic intrathoracic, intra-abdominal or pelvic finding.      RADIATION DOSE REDUCTION: All CT scans are performed using radiation dose reduction techniques, when applicable. Technical factors are evaluated and adjusted to ensure appropriate moderation of exposure.    Electronically  signed by:  Colten Del Toro MD  1/1/2023 10:10 PM CST Workstation: [id number]                                     X-Ray Tibia Fibula 2 View Left (Final result)  Result time 01/01/23 18:50:02      Final result by Elva Bourgeois DO (01/01/23 18:50:02)                   Narrative:    Left tibia/fibular radiographs: 1/1/2023 6:20 PM CST    COMPARISON: None available    TECHNIQUE: AP and lateral images of the left tibia and fibula were obtained.    History: 47 years  old Male with fall, pain.    FINDINGS: There are no findings to suggest an acute fracture or subluxation within the left tibia or fibula. The visualized soft tissues appear grossly unremarkable.    IMPRESSION: There are no findings to suggest an acute fracture or subluxation within the left tibia or fibula.    Electronically signed by:  Elva Bourgeois DO  1/1/2023 6:50 PM CST Workstation: [id number]                                     X-Ray Knee Complete 4 or more Views Left (Final result)  Result time 01/01/23 18:50:24   Procedure changed from X-Ray Knee 3 View Left     Final result by Jacqueline Cummins MD (01/01/23 18:50:24)                   Narrative:    Left knee 4 views    Clinical data: Pain    FINDINGS: 4 views are negative for fracture, dislocation, or osseous destructive lesion. There is mild joint space narrowing of the patellofemoral joint. There is spurring of the superior patella. Soft tissues are normal.    IMPRESSION: Mild degenerative changes of the patellofemoral joint with no acute osseous abnormality    Electronically signed by:  Jacqueline Cummins MD  1/1/2023 6:49 PM CST Workstation: [id number]                      Final result by Jacqueline Cummins MD (01/01/23 18:49:08)                   Narrative:    Left knee 4 views    Clinical data: Pain    FINDINGS: 4 views are negative for fracture, dislocation, or osseous destructive lesion. There is mild joint space narrowing of the patellofemoral joint. There is spurring of the superior  patella. Soft tissues are normal.    IMPRESSION: Mild degenerative changes of the patellofemoral joint with no acute osseous abnormality    Electronically signed by:  Jacqueline Cummins MD  1/1/2023 6:49 PM CST Workstation: [id number]                                     X-Ray Ankle Complete Left (Final result)  Result time 01/01/23 18:50:24      Final result by Jacqueline Cummins MD (01/01/23 18:50:24)                   Narrative:    Left knee 4 views    Clinical data: Pain    FINDINGS: 4 views are negative for fracture, dislocation, or osseous destructive lesion. There is mild joint space narrowing of the patellofemoral joint. There is spurring of the superior patella. Soft tissues are normal.    IMPRESSION: Mild degenerative changes of the patellofemoral joint with no acute osseous abnormality    Electronically signed by:  Jacqueline Cummins MD  1/1/2023 6:49 PM CST Workstation: [id number]                                     X-Ray Hip 2 or 3 views Left (with Pelvis when performed) (Final result)  Result time 01/01/23 18:49:18      Final result by Elva Bourgeois DO (01/01/23 18:49:18)                   Narrative:    Left hip and pelvic radiographs: 1/1/2023 6:49 PM CST    TECHNIQUE: AP pelvis; AP and lateral views of the left hip were obtained.    COMPARISON: None available    History: 47 years  old Male with PAIN.    FINDINGS: Both sacroiliac joints appear unremarkable.    There visualized portions of the lower lumbar spine are unremarkable.    There are no findings to suggest an acute fracture or subluxation within the left hip.    There are no findings to suggest an acute fracture of the pelvis.    IMPRESSION: There are no findings to suggest an acute fracture or subluxation of the left hip.  There are no findings to suggest an acute fracture within the pelvis or left hip.    Electronically signed by:  Elva Bourgeois DO  1/1/2023 6:49 PM CST Workstation: [id number]                                     XR Ribs  Min 3 Views w/PA Chest Right (Final result)  Result time 01/01/23 18:48:25      Final result by Elva Bourgeois DO (01/01/23 18:48:25)                   Narrative:    RIGHT RIB AND CHEST RADIOGRAPHS: 1/1/2023 6:20 PM CST    HISTORY:  47 years  old Male with PAIN.    TECHNIQUE: AP and oblique images of the right ribs are obtained. AP view of the chest was also obtained.    COMPARISON: Chest radiograph dated 9/16/2010.    FINDINGS: There are no findings to suggest a pneumothorax. There are no findings to suggest an acute, displaced right rib fracture or subluxation.    The cardiomediastinal silhouette is normal in size.No pneumothorax is seen. No acute airspace opacities are seen. No discrete pleural effusion is apparent.    There is a laparoscopic gastric band seen. The angle is within normal limits.    IMPRESSION: There are no findings to suggest an acute, displaced fracture or subluxation within the right ribs.  No acute airspace opacities are seen.    Electronically signed by:  Elva Bourgeois DO  1/1/2023 6:48 PM CST Workstation: [id number]                                     Medications   morphine injection 4 mg (4 mg Intravenous Given 1/1/23 2010)           APC / Resident Notes:   This is an urgent evaluation of a 47 year old male with c/o chest, abdominal, and joint pain s/p fall and trauma to chest/abdomen. Xrays of hip, ankle, knee, and ribs reviewed with no evidence of acute fracture. I reviewed the CT and radiology report which is as follows: Bruising of the anterior abdominal wall consistent with injury.  2: Distended esophagus with fluid. Possibly related to the tightness of the gastric band.  3: No acute traumatic intrathoracic, intra-abdominal or pelvic finding.  The patient's creatinine is 1.8 but he has a history of CKD and this is consistent with patient's previous levels.   Patient's workup today significant for contusions to left ankle, left knee, right chest wall, and abdomen. He denies head  injury/trauma. He was placed in a knee immobilizer and provided crutches. Ankle was also placed in ace wrap for comfort. Strict ED return precautions discussed and patient verbalized understanding. He was instructed to follow up with his PCP. Based on my clinical evaluation, I do not appreciate any immediate, emergent, or life threatening condition or etiology that warrants additional workup today and feel that the patient can be discharged with close follow up care.                      Clinical Impression:   Final diagnoses:  [R52] Pain  [S90.02XA] Contusion of left ankle, initial encounter (Primary)  [S80.02XA] Contusion of left knee, initial encounter  [W19.XXXA] Fall, initial encounter  [S20.211A] Contusion of rib on right side, initial encounter        ED Disposition Condition    Discharge Stable          ED Prescriptions       Medication Sig Dispense Start Date End Date Auth. Provider    methocarbamoL (ROBAXIN) 500 MG Tab Take 2 tablets (1,000 mg total) by mouth every 8 (eight) hours as needed (muscle spasm). 30 tablet 1/1/2023 1/6/2023 Yaima Connelly NP          Follow-up Information       Follow up With Specialties Details Why Contact Info Additional Information    [facility] - Emergency Dept Emergency Medicine  As needed, If symptoms worsen [address] [facility] [address] [phone] 1st floor    El Galvan MD Internal Medicine Schedule an appointment as soon as possible for a visit  As needed [address] [facility]  [place] [address]  [phone]                Yaima Connelly NP  01/02/23 0024

## 2023-08-14 ENCOUNTER — OFFICE VISIT (OUTPATIENT)
Dept: URGENT CARE | Facility: CLINIC | Age: 48
End: 2023-08-14
Payer: MEDICARE

## 2023-08-14 VITALS
OXYGEN SATURATION: 98 % | HEART RATE: 81 BPM | WEIGHT: 245 LBS | TEMPERATURE: 98 F | RESPIRATION RATE: 18 BRPM | DIASTOLIC BLOOD PRESSURE: 90 MMHG | BODY MASS INDEX: 32.47 KG/M2 | SYSTOLIC BLOOD PRESSURE: 135 MMHG | HEIGHT: 73 IN

## 2023-08-14 DIAGNOSIS — M72.2 PLANTAR FASCIITIS OF RIGHT FOOT: Primary | ICD-10-CM

## 2023-08-14 PROCEDURE — 96372 PR INJECTION,THERAP/PROPH/DIAG2ST, IM OR SUBCUT: ICD-10-PCS | Mod: S$GLB,,, | Performed by: STUDENT IN AN ORGANIZED HEALTH CARE EDUCATION/TRAINING PROGRAM

## 2023-08-14 PROCEDURE — 96372 THER/PROPH/DIAG INJ SC/IM: CPT | Mod: S$GLB,,, | Performed by: STUDENT IN AN ORGANIZED HEALTH CARE EDUCATION/TRAINING PROGRAM

## 2023-08-14 PROCEDURE — 99204 PR OFFICE/OUTPT VISIT, NEW, LEVL IV, 45-59 MIN: ICD-10-PCS | Mod: 25,S$GLB,, | Performed by: STUDENT IN AN ORGANIZED HEALTH CARE EDUCATION/TRAINING PROGRAM

## 2023-08-14 PROCEDURE — 99204 OFFICE O/P NEW MOD 45 MIN: CPT | Mod: 25,S$GLB,, | Performed by: STUDENT IN AN ORGANIZED HEALTH CARE EDUCATION/TRAINING PROGRAM

## 2023-08-14 RX ORDER — METHOCARBAMOL 500 MG/1
TABLET, FILM COATED ORAL
COMMUNITY
Start: 2023-01-01

## 2023-08-14 RX ORDER — KETOROLAC TROMETHAMINE 30 MG/ML
60 INJECTION, SOLUTION INTRAMUSCULAR; INTRAVENOUS
Status: COMPLETED | OUTPATIENT
Start: 2023-08-14 | End: 2023-08-14

## 2023-08-14 RX ORDER — IBUPROFEN 600 MG/1
600 TABLET ORAL EVERY 6 HOURS PRN
Qty: 30 TABLET | Refills: 0 | Status: SHIPPED | OUTPATIENT
Start: 2023-08-14 | End: 2023-08-14

## 2023-08-14 RX ORDER — HYDROCODONE BITARTRATE AND ACETAMINOPHEN 5; 325 MG/1; MG/1
1 TABLET ORAL EVERY 6 HOURS PRN
Qty: 15 TABLET | Refills: 0 | Status: SHIPPED | OUTPATIENT
Start: 2023-08-14 | End: 2024-02-20

## 2023-08-14 RX ADMIN — KETOROLAC TROMETHAMINE 60 MG: 30 INJECTION, SOLUTION INTRAMUSCULAR; INTRAVENOUS at 07:08

## 2023-08-15 NOTE — PATIENT INSTRUCTIONS
Thankyou for the opportunity to care of you today.  Please take all medications as directed, continue any previous prescribed medications unless we specifically discussed holding them.  If your symptoms do not resolve or worsen please return to the clinic for re-evaluation, if your situation becomes emergent please present to to the nearest emergency department.  Follow-up with your PCP for continued evaluation and management.    Use ice water bottle as we discussed.  Use a tennis ball as we discussed.   Drink plenty of fluids.  Search [employer] for plantar fasciitis splint and use this nightly.  If symptoms do not improve we will recommend Podiatry for possible injections.

## 2023-08-15 NOTE — PROGRESS NOTES
"Subjective:      Patient ID: Temo Barney is a 48 y.o. male.    Vitals:  height is 6' 1" (1.854 m) and weight is 111.1 kg (245 lb). His oral temperature is 98.2 °F (36.8 °C). His blood pressure is 135/90 (abnormal) and his pulse is 81. His respiration is 18 and oxygen saturation is 98%.     Chief Complaint: Heel Pain    Pt states that "he has been having heel pain for about a week. Today his foot is inflamed and he is having pain putting weight on his right foot. Pt's pain scale is a 10/10."    Ambulatory to room with complaint of right heel pain.  Patient states unable to walk or bear feet, pain is worse in the morning.  Denies trauma.        Musculoskeletal:  Positive for pain.        Positive for right heel pain.      Objective:     Physical Exam   Constitutional: He is oriented to person, place, and time. He appears well-developed. He is cooperative.   HENT:   Head: Normocephalic and atraumatic.   Ears:   Right Ear: Hearing, tympanic membrane, external ear and ear canal normal.   Left Ear: Hearing, tympanic membrane, external ear and ear canal normal.   Nose: Nose normal. No mucosal edema or nasal deformity. No epistaxis. Right sinus exhibits no maxillary sinus tenderness and no frontal sinus tenderness. Left sinus exhibits no maxillary sinus tenderness and no frontal sinus tenderness.   Mouth/Throat: Uvula is midline, oropharynx is clear and moist and mucous membranes are normal. No trismus in the jaw. Normal dentition. No uvula swelling.   Eyes: Conjunctivae and lids are normal.   Neck: Trachea normal and phonation normal. Neck supple.   Cardiovascular: Normal rate, regular rhythm, normal heart sounds and normal pulses.   Pulmonary/Chest: Effort normal and breath sounds normal.   Abdominal: Normal appearance and bowel sounds are normal. Soft.   Musculoskeletal: Normal range of motion.         General: Normal range of motion.      Comments: Tender to palpation your right heel.   Neurological: He is alert and " oriented to person, place, and time. He exhibits normal muscle tone.   Skin: Skin is warm, dry and intact.   Psychiatric: His speech is normal and behavior is normal. Judgment and thought content normal.   Nursing note and vitals reviewed.      Assessment:     1. Plantar fasciitis of right foot        Plan:       Plantar fasciitis of right foot    Other orders  -     ibuprofen (ADVIL,MOTRIN) 600 MG tablet; Take 1 tablet (600 mg total) by mouth every 6 (six) hours as needed for Pain.  Dispense: 30 tablet; Refill: 0              Instructed him to use a water bottle frozen nightly to roll across his right sole.  Also instructed him to use a tennis ball to massage his foot.  He should not walk barefoot.  Instructed him to take pain medications as prescribed.  We will give a Toradol injection in clinic today however cautious to continue NSAID therapy secondary to existing chronic kidney disease.  He was instructed to follow up Podiatry if symptoms do not resolve.

## 2024-02-21 PROBLEM — N28.1 RENAL CYST: Status: ACTIVE | Noted: 2024-02-21

## 2024-02-21 PROBLEM — R80.9 MICROALBUMINURIA: Status: ACTIVE | Noted: 2024-02-21

## 2024-02-21 PROBLEM — R60.9 EDEMA: Status: ACTIVE | Noted: 2024-02-21

## 2024-07-21 ENCOUNTER — OFFICE VISIT (OUTPATIENT)
Dept: URGENT CARE | Facility: CLINIC | Age: 49
End: 2024-07-21
Payer: MEDICARE

## 2024-07-21 VITALS
DIASTOLIC BLOOD PRESSURE: 87 MMHG | TEMPERATURE: 98 F | HEART RATE: 89 BPM | HEIGHT: 73 IN | BODY MASS INDEX: 30.07 KG/M2 | SYSTOLIC BLOOD PRESSURE: 138 MMHG | WEIGHT: 226.88 LBS | OXYGEN SATURATION: 96 % | RESPIRATION RATE: 16 BRPM

## 2024-07-21 DIAGNOSIS — H66.92 LEFT OTITIS MEDIA, UNSPECIFIED OTITIS MEDIA TYPE: Primary | ICD-10-CM

## 2024-07-21 DIAGNOSIS — H92.02 LEFT EAR PAIN: ICD-10-CM

## 2024-07-21 PROCEDURE — 99213 OFFICE O/P EST LOW 20 MIN: CPT | Mod: S$GLB,,, | Performed by: NURSE PRACTITIONER

## 2024-07-21 RX ORDER — ACETAMINOPHEN 500 MG
1000 TABLET ORAL
Status: COMPLETED | OUTPATIENT
Start: 2024-07-21 | End: 2024-07-21

## 2024-07-21 RX ORDER — HYDRALAZINE HYDROCHLORIDE 100 MG/1
1 TABLET, FILM COATED ORAL 2 TIMES DAILY
COMMUNITY
Start: 2024-07-19

## 2024-07-21 RX ORDER — CETIRIZINE HYDROCHLORIDE 10 MG/1
10 TABLET ORAL DAILY
Qty: 30 TABLET | Refills: 0 | Status: SHIPPED | OUTPATIENT
Start: 2024-07-21 | End: 2024-08-20

## 2024-07-21 RX ORDER — AMOXICILLIN 500 MG/1
500 TABLET, FILM COATED ORAL EVERY 12 HOURS
Qty: 20 TABLET | Refills: 0 | Status: SHIPPED | OUTPATIENT
Start: 2024-07-21 | End: 2024-07-31

## 2024-07-21 RX ADMIN — Medication 1000 MG: at 02:07

## 2024-07-21 NOTE — PATIENT INSTRUCTIONS
Discharge instructions Left Ear infection    When do I need to call the doctor?   Your symptoms are not getting better in 2 to 3 days.  You continue to have problems hearing after 2 to 3 weeks.  You have a fever of 100.4°F (38°C) or higher or chills.  You have discharge or fluid coming from your ear.  You have new or worsening symptoms    1) See orders for this visit as documented in the electronic medical record.  2) Symptomatic therapy suggested: use acetaminophen every 6-8 hours prn pain or fever, push fluids.   3) Call or return to clinic prn if these symptoms worsen or fail to improve as anticipated.    Discussed results/diagnosis/plan with patient in clinic.  We had shared decision making for patient's treatment. Patient verbalized understanding and in agreement with current treatment plan.     Patient was instructed to return for re-evaluation with urgent care or PCP for continued outpatient workup and management if symptoms do not improve/worsening symptoms. Strict ED versus clinic precautions given in depth.    Discharge and follow-up instructions given verbally/printed with the patient who expressed understanding. The instructions and results are also available on [employer].      - You must understand that you have received an Urgent Care treatment only and that you may be released before all of your medical problems are known or treated.   - You, the patient, will arrange for follow up care as instructed.   - Follow up with your PCP or specialty clinic as directed in the next 1-2 weeks if not improved or as needed.  You can call [phone] to schedule an appointment with the appropriate provider.   - If your condition worsens or fails to improve we recommend that you receive another evaluation at the ER immediately or contact your PCP to discuss your concerns or return here.        ETHAN Bingham

## 2024-07-21 NOTE — PROGRESS NOTES
"Subjective:      Patient ID: Temo Barney is a 49 y.o. male.    Vitals:  height is 6' 1" (1.854 m) and weight is 102.9 kg (226 lb 13.7 oz). His oral temperature is 98.3 °F (36.8 °C). His blood pressure is 138/87 and his pulse is 89. His respiration is 16 and oxygen saturation is 96%.     Chief Complaint: Otalgia    Otalgia   There is pain in the left ear. This is a new problem. Episode onset: 2 days ago. The problem has been gradually worsening. There has been no fever. The pain is at a severity of 10/10. The pain is severe. Pertinent negatives include no abdominal pain, coughing, diarrhea, ear discharge, headaches, hearing loss, neck pain, rash, rhinorrhea, sore throat or vomiting. He has tried nothing for the symptoms. The treatment provided no relief. There is no history of a chronic ear infection, hearing loss or a tympanostomy tube.       Constitution: Negative for chills, sweating, fatigue and fever.   HENT:  Positive for ear pain. Negative for ear discharge, hearing loss, congestion and sore throat.    Neck: Negative for neck pain and neck stiffness.   Cardiovascular:  Negative for chest pain, leg swelling, palpitations and sob on exertion.   Eyes:  Negative for eye pain, eye redness and vision loss.   Respiratory:  Negative for cough, sputum production and shortness of breath.    Gastrointestinal:  Negative for abdominal pain, nausea, vomiting and diarrhea.   Genitourinary:  Negative for dysuria, frequency, urgency, flank pain and hematuria.   Musculoskeletal:  Negative for pain and trauma.   Skin:  Negative for color change and rash.   Neurological:  Negative for dizziness, headaches and disorientation.   Psychiatric/Behavioral:  Negative for disorientation.       Objective:     Physical Exam   Constitutional: He is cooperative. awake  HENT:   Head: Normocephalic and atraumatic.   Ears:   Right Ear: Hearing, tympanic membrane, external ear and ear canal normal.   Left Ear: Hearing and external ear normal. " Tympanic membrane is erythematous and bulging.   Nose: No congestion.   Mouth/Throat: Posterior oropharyngeal erythema present.   Left periauricular pain upon palpation      Comments: Left periauricular pain upon palpation  Eyes: Lids are normal. Pupils are equal, round, and reactive to light. Extraocular movement intact   Neck: No thyromegaly present.   Cardiovascular: Normal rate, regular rhythm, S1 normal, S2 normal, normal heart sounds and normal pulses.   Pulmonary/Chest: Effort normal and breath sounds normal. No respiratory distress. He has no decreased breath sounds. He has no wheezes. He has no rales.   Abdominal: Normal appearance and bowel sounds are normal. Soft. flat abdomen   Musculoskeletal:      Right lower leg: No edema.      Left lower leg: No edema.   Lymphadenopathy:     He has no cervical adenopathy.   Neurological: no focal deficit. He is alert.   Skin: Skin is warm. Capillary refill takes less than 2 seconds.   Psychiatric: Mood normal.   Vitals reviewed.      Assessment:     1. Left otitis media, unspecified otitis media type    2. Left ear pain        Plan:       Left otitis media, unspecified otitis media type  -     amoxicillin (AMOXIL) 500 MG Tab; Take 1 tablet (500 mg total) by mouth every 12 (twelve) hours. for 10 days  Dispense: 20 tablet; Refill: 0  -     cetirizine (ZYRTEC) 10 MG tablet; Take 1 tablet (10 mg total) by mouth once daily.  Dispense: 30 tablet; Refill: 0    Left ear pain  -     acetaminophen tablet 1,000 mg  -     cetirizine (ZYRTEC) 10 MG tablet; Take 1 tablet (10 mg total) by mouth once daily.  Dispense: 30 tablet; Refill: 0        Patient Instructions   Discharge instructions Left Ear infection    When do I need to call the doctor?   Your symptoms are not getting better in 2 to 3 days.  You continue to have problems hearing after 2 to 3 weeks.  You have a fever of 100.4°F (38°C) or higher or chills.  You have discharge or fluid coming from your ear.  You have new or  worsening symptoms    1) See orders for this visit as documented in the electronic medical record.  2) Symptomatic therapy suggested: use acetaminophen every 6-8 hours prn pain or fever, push fluids.   3) Call or return to clinic prn if these symptoms worsen or fail to improve as anticipated.    Discussed results/diagnosis/plan with patient in clinic.  We had shared decision making for patient's treatment. Patient verbalized understanding and in agreement with current treatment plan.     Patient was instructed to return for re-evaluation with urgent care or PCP for continued outpatient workup and management if symptoms do not improve/worsening symptoms. Strict ED versus clinic precautions given in depth.    Discharge and follow-up instructions given verbally/printed with the patient who expressed understanding. The instructions and results are also available on [employer].      - You must understand that you have received an Urgent Care treatment only and that you may be released before all of your medical problems are known or treated.   - You, the patient, will arrange for follow up care as instructed.   - Follow up with your PCP or specialty clinic as directed in the next 1-2 weeks if not improved or as needed.  You can call [phone] to schedule an appointment with the appropriate provider.   - If your condition worsens or fails to improve we recommend that you receive another evaluation at the ER immediately or contact your PCP to discuss your concerns or return here.        ETHAN Bingham

## 2024-08-29 ENCOUNTER — HOSPITAL ENCOUNTER (EMERGENCY)
Facility: HOSPITAL | Age: 49
Discharge: HOME | End: 2024-08-29
Attending: EMERGENCY MEDICINE
Payer: MEDICARE

## 2024-08-29 VITALS
WEIGHT: 230 LBS | DIASTOLIC BLOOD PRESSURE: 84 MMHG | OXYGEN SATURATION: 99 % | HEART RATE: 80 BPM | BODY MASS INDEX: 30.48 KG/M2 | RESPIRATION RATE: 18 BRPM | HEIGHT: 73 IN | SYSTOLIC BLOOD PRESSURE: 138 MMHG | TEMPERATURE: 99 F

## 2024-08-29 DIAGNOSIS — Z71.1 CONCERN ABOUT STD IN MALE WITHOUT DIAGNOSIS: ICD-10-CM

## 2024-08-29 DIAGNOSIS — Z20.2 POSSIBLE EXPOSURE TO STD: Primary | ICD-10-CM

## 2024-08-29 PROCEDURE — 99282 EMERGENCY DEPT VISIT SF MDM: CPT

## 2024-08-29 PROCEDURE — 87491 CHLMYD TRACH DNA AMP PROBE: CPT

## 2024-08-29 NOTE — FIRST PROVIDER EVALUATION
Emergency Department TeleTriage Encounter Note      CHIEF COMPLAINT    Chief Complaint   Patient presents with    Exposure to STD     WORRIED, NO SYMPTOMS. STATES CONDOM POPPED         VITAL SIGNS   Initial Vitals [08/29/24 1519]   BP Pulse Resp Temp SpO2   (!) 141/86 81 16 99.3 °F (37.4 °C) 99 %      MAP       --            ALLERGIES    Review of patient's allergies indicates:  No Known Allergies    PROVIDER TRIAGE NOTE  Verbal consent for the teletriage evaluation was given by the patient at the start of the evaluation.  All efforts will be made to maintain patient's privacy during the evaluation.      This is a teletriage evaluation of a 49 y.o. male presenting to the ED with c/o had unprotected sex, wants to testing.  No symptoms and no known exposure. Limited physical exam via telehealth: The patient is awake, alert, answering questions appropriately and is not in respiratory distress.  As the Teletriage provider, I performed an initial assessment and ordered appropriate labs and imaging studies, if any, to facilitate the patient's care once placed in the ED. Once a room is available, care and a full evaluation will be completed by an alternate ED provider.  Any additional orders and the final disposition will be determined by that provider.  All imaging and labs will not be followed-up by the Teletriage Team, including myself.          ORDERS  Labs Reviewed - No data to display    ED Orders (720h ago, onward)      None              Virtual Visit Note: The provider triage portion of this emergency department evaluation and documentation was performed via [employer], a HIPAA-compliant telemedicine application, in concert with a tele-presenter in the room. A face to face patient evaluation with one of my colleagues will occur once the patient is placed in an emergency department room.      DISCLAIMER: This note was prepared with [employer] voice recognition transcription software. Garbled syntax, mangled  pronouns, and other bizarre constructions may be attributed to that software system.

## 2024-08-29 NOTE — ED PROVIDER NOTES
Encounter Date: 8/29/2024       History     Chief Complaint   Patient presents with    Exposure to STD     WORRIED, NO SYMPTOMS. STATES CONDOM POPPED       49-year-old male presents ER for evaluation of possible exposure to STD.  Patient reports that he was having sexual intercourse when the condom ripped.  He has no symptoms but wanted to get checked for STDs.  He denies any penile discharge, dysuria or pelvic pain.  Denies any flank pain.  No fever chills.     The history is provided by the patient.     Review of patient's allergies indicates:  No Known Allergies  Past Medical History:   Diagnosis Date    GERD (gastroesophageal reflux disease)     Gout, chronic     High cholesterol     Hypertension      Past Surgical History:   Procedure Laterality Date    CIRCUMCISION      LAPAROSCOPIC GASTRIC BANDING      2011     Family History   Problem Relation Name Age of Onset    Hypertension Mother      Diabetes Mother      Hypertension Father       Social History     Tobacco Use    Smoking status: Never    Smokeless tobacco: Never   Substance Use Topics    Alcohol use: No    Drug use: No     Review of Systems   Constitutional:  Negative for chills and fever.   HENT:  Negative for congestion.    Eyes:  Negative for visual disturbance.   Respiratory:  Negative for shortness of breath.    Cardiovascular:  Negative for chest pain.   Gastrointestinal:  Negative for nausea and vomiting.   Genitourinary:  Negative for dysuria and flank pain.   Musculoskeletal:  Negative for myalgias.   Skin:  Negative for rash.   Allergic/Immunologic: Negative for immunocompromised state.   Neurological:  Negative for weakness and numbness.   Hematological:  Does not bruise/bleed easily.   Psychiatric/Behavioral:  Negative for confusion.        Physical Exam     Initial Vitals [08/29/24 1519]   BP Pulse Resp Temp SpO2   (!) 141/86 81 16 99.3 °F (37.4 °C) 99 %      MAP       --         Physical Exam    Vitals reviewed.  Constitutional: He appears  well-developed and well-nourished. He is not diaphoretic. No distress.   HENT:   Head: Normocephalic and atraumatic.   Eyes: Conjunctivae and EOM are normal.   Neck: Neck supple.   Pulmonary/Chest: No respiratory distress.   Musculoskeletal:         General: Normal range of motion.      Cervical back: Neck supple.     Neurological: He is alert and oriented to person, place, and time.         ED Course   Procedures  Labs Reviewed   C. TRACHOMATIS/N. GONORRHOEAE BY AMP DNA          Imaging Results    None          Medications - No data to display  Medical Decision Making       APC / Resident Notes:   Patient seen in the ER promptly upon arrival.  He is afebrile, no acute distress.  Physical examination unremarkable.    GC obtained, pending results.  Patient informed that he will receive a call if the test is positive.  Will advise to refrain from sexual activity until he receives his test results.  Encouraged safe sex practices.  He is otherwise stable for discharge at this time.                                   Clinical Impression:  Final diagnoses:  [Z20.2] Possible exposure to STD (Primary)  [Z71.1] Concern about STD in male without diagnosis          ED Disposition Condition    Discharge Stable          ED Prescriptions    None       Follow-up Information       Follow up With Specialties Details Why Contact Info    El Galvan MD Internal Medicine   [address] [facility]  [place] [address]  [phone]               Angela Robertson PA-C  08/29/24 6173

## 2024-08-29 NOTE — DISCHARGE INSTRUCTIONS
We will call you if your test results is positive.  No sexual activity until you have the results of your test.

## 2024-08-31 LAB
CHLAMYDIA, AMPLIFIED DNA: NEGATIVE
N GONORRHOEAE, AMPLIFIED DNA: NEGATIVE

## 2024-11-09 ENCOUNTER — HOSPITAL ENCOUNTER (EMERGENCY)
Facility: HOSPITAL | Age: 49
Discharge: HOME | End: 2024-11-09
Attending: EMERGENCY MEDICINE
Payer: MEDICARE

## 2024-11-09 VITALS
DIASTOLIC BLOOD PRESSURE: 105 MMHG | TEMPERATURE: 98 F | BODY MASS INDEX: 30.97 KG/M2 | HEIGHT: 73 IN | HEART RATE: 62 BPM | WEIGHT: 233.69 LBS | OXYGEN SATURATION: 97 % | RESPIRATION RATE: 16 BRPM | SYSTOLIC BLOOD PRESSURE: 151 MMHG

## 2024-11-09 DIAGNOSIS — D50.9 IRON DEFICIENCY ANEMIA, UNSPECIFIED IRON DEFICIENCY ANEMIA TYPE: ICD-10-CM

## 2024-11-09 DIAGNOSIS — K62.5 RECTAL BLEEDING: Primary | ICD-10-CM

## 2024-11-09 LAB
ABO + RH BLD: NORMAL
BASOPHILS # BLD AUTO: 0.05 K/UL (ref 0–0.2)
BASOPHILS NFR BLD: 0.7 % (ref 0–1.9)
BLD GP AB SCN CELLS X3 SERPL QL: NORMAL
DIFFERENTIAL METHOD BLD: ABNORMAL
EOSINOPHIL # BLD AUTO: 0.3 K/UL (ref 0–0.5)
EOSINOPHIL NFR BLD: 3.6 % (ref 0–8)
ERYTHROCYTE [DISTWIDTH] IN BLOOD BY AUTOMATED COUNT: 17.2 % (ref 11.5–14.5)
HCT VFR BLD AUTO: 35 % (ref 40–54)
HGB BLD-MCNC: 11.9 G/DL (ref 14–18)
IMM GRANULOCYTES # BLD AUTO: 0.02 K/UL (ref 0–0.04)
IMM GRANULOCYTES NFR BLD AUTO: 0.3 % (ref 0–0.5)
LYMPHOCYTES # BLD AUTO: 2.1 K/UL (ref 1–4.8)
LYMPHOCYTES NFR BLD: 27.2 % (ref 18–48)
MCH RBC QN AUTO: 26.2 PG (ref 27–31)
MCHC RBC AUTO-ENTMCNC: 34 G/DL (ref 32–36)
MCV RBC AUTO: 77 FL (ref 82–98)
MONOCYTES # BLD AUTO: 0.6 K/UL (ref 0.3–1)
MONOCYTES NFR BLD: 8 % (ref 4–15)
NEUTROPHILS # BLD AUTO: 4.5 K/UL (ref 1.8–7.7)
NEUTROPHILS NFR BLD: 60.2 % (ref 38–73)
NRBC BLD-RTO: 0 /100 WBC
PLATELET # BLD AUTO: 258 K/UL (ref 150–450)
PMV BLD AUTO: 9.6 FL (ref 9.2–12.9)
RBC # BLD AUTO: 4.55 M/UL (ref 4.6–6.2)
SPECIMEN OUTDATE: NORMAL
WBC # BLD AUTO: 7.53 K/UL (ref 3.9–12.7)

## 2024-11-09 PROCEDURE — 80053 COMPREHEN METABOLIC PANEL: CPT | Performed by: STUDENT IN AN ORGANIZED HEALTH CARE EDUCATION/TRAINING PROGRAM

## 2024-11-09 PROCEDURE — 85025 COMPLETE CBC W/AUTO DIFF WBC: CPT | Performed by: STUDENT IN AN ORGANIZED HEALTH CARE EDUCATION/TRAINING PROGRAM

## 2024-11-09 PROCEDURE — 86900 BLOOD TYPING SEROLOGIC ABO: CPT | Performed by: STUDENT IN AN ORGANIZED HEALTH CARE EDUCATION/TRAINING PROGRAM

## 2024-11-09 PROCEDURE — 99283 EMERGENCY DEPT VISIT LOW MDM: CPT | Mod: 25

## 2024-11-09 NOTE — ED NOTES
"Pt stated he has had "hard stools" for the past 2 weeks and thinks he might have hemorrhoids that are causing the rectal bleeding. Pt stated he has had this happen to him multiple times in the past.   "

## 2024-11-09 NOTE — ED PROVIDER NOTES
Encounter Date: 11/9/2024       History     Chief Complaint   Patient presents with    Rectal Bleeding     Rectal bleeding x3 days  Was dark blood at first, now bright red blood  Hx of hemorrhoids and abdominal sx.      Emergent evaluation of a 49-year-old male with history of gastric banding in 2011, GERD, external hemorrhoids, iron-deficiency anemia, hypertension hyperlipidemia presents to the ER due to blood in stool for 2 days.  Patient reports on Thursday after he has been having difficulty having a bowel movement he reports he was straining to have a bowel movement in had stool that had dark red blood with it.  He reports he was not have any rectal pain and did not note having any internal or external hemorrhoids.  He was no abdominal pain.  He reports on Friday night in the evening and then again at 11:00 p.m. he reports he was into the restroom and had bright red blood into the toilet mixed with stool.  He reports again no pain.  No external hemorrhoids.  No abdominal pain.  No nausea or vomiting.  He reports no weakness dizziness or lightheadedness.  No chest pain or shortness of breath.  He was not take stool softeners.  He reports he normally only has a bowel movement every several days and he was have to strain to use the restroom.  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  Reports no bowel movements since 11:00 p.m., 6 hours ago.      Review of patient's allergies indicates:  No Known Allergies  Past Medical History:   Diagnosis Date    GERD (gastroesophageal reflux disease)     Gout, chronic     High cholesterol     Hypertension      Past Surgical History:   Procedure Laterality Date    CIRCUMCISION      LAPAROSCOPIC GASTRIC BANDING      2011     Family History   Problem Relation Name Age of Onset    Hypertension Mother      Diabetes Mother      Hypertension Father       Social History     Tobacco Use    Smoking  status: Never    Smokeless tobacco: Never   Substance Use Topics    Alcohol use: No    Drug use: No     Review of Systems   Constitutional:  Negative for activity change, appetite change, chills, diaphoresis, fatigue and fever.   Respiratory:  Negative for cough, chest tightness and shortness of breath.    Cardiovascular:  Negative for chest pain and palpitations.   Gastrointestinal:  Positive for anal bleeding and blood in stool. Negative for abdominal distention, abdominal pain, constipation, diarrhea, nausea, rectal pain and vomiting.   Musculoskeletal:  Negative for back pain.   Skin:  Negative for pallor.   Neurological:  Negative for dizziness, syncope, weakness, light-headedness, numbness and headaches.   All other systems reviewed and are negative.      Physical Exam     Initial Vitals [11/09/24 0012]   BP Pulse Resp Temp SpO2   (!) 138/99 81 16 98.3 °F (36.8 °C) 98 %      MAP       --         Physical Exam    Nursing note and vitals reviewed.  Constitutional: He appears well-developed and well-nourished. He is not diaphoretic. No distress.   HENT:   Head: Normocephalic and atraumatic.   Right Ear: External ear normal.   Left Ear: External ear normal.   Nose: Nose normal. Mouth/Throat: Oropharynx is clear and moist.   Eyes: Conjunctivae and EOM are normal. Pupils are equal, round, and reactive to light.   Neck: Neck supple. No tracheal deviation present.   Normal range of motion.  Cardiovascular:  Normal rate, regular rhythm, normal heart sounds and intact distal pulses.     Exam reveals no gallop and no friction rub.       No murmur heard.  Pulmonary/Chest: Breath sounds normal. No stridor. No respiratory distress. He has no wheezes. He has no rhonchi. He has no rales. He exhibits no tenderness.   Abdominal: Abdomen is soft. Bowel sounds are normal. He exhibits no distension and no mass. There is no abdominal tenderness. There is no rebound and no guarding.   Genitourinary:    Genitourinary Comments: Normal  external anal exam no signs of anal fissure or external hemorrhoids.  On digital rectal exam no internal hemorrhoids palpated.  No bright red blood per rectum or stool.     Musculoskeletal:         General: No edema. Normal range of motion.      Cervical back: Normal range of motion and neck supple.     Neurological: He is alert and oriented to person, place, and time. He has normal strength. No cranial nerve deficit or sensory deficit.   Skin: Skin is warm and dry. No rash noted. No erythema. No pallor.   Psychiatric: He has a normal mood and affect. His behavior is normal. Judgment and thought content normal.         ED Course   Procedures  Labs Reviewed   CBC W/ AUTO DIFFERENTIAL - Abnormal       Result Value    WBC 7.53      RBC 4.55 (*)     Hemoglobin 11.9 (*)     Hematocrit 35.0 (*)     MCV 77 (*)     MCH 26.2 (*)     MCHC 34.0      RDW 17.2 (*)     Platelets 258      MPV 9.6      Immature Granulocytes 0.3      Gran # (ANC) 4.5      Immature Grans (Abs) 0.02      Lymph # 2.1      Mono # 0.6      Eos # 0.3      Baso # 0.05      nRBC 0      Gran % 60.2      Lymph % 27.2      Mono % 8.0      Eosinophil % 3.6      Basophil % 0.7      Differential Method Automated     COMPREHENSIVE METABOLIC PANEL   TYPE & SCREEN    Group & Rh O POS      Indirect Joey NEG      Specimen Outdate 11/12/2024 23:59            Imaging Results    None          Medications - No data to display  Medical Decision Making  Emergent evaluation of a 49-year-old male with history of gastric banding in 2011, GERD, external hemorrhoids, iron-deficiency anemia, hypertension hyperlipidemia presents to the ER due to blood in stool for 2 days.  Patient reports on Thursday after he has been having difficulty having a bowel movement he reports he was straining to have a bowel movement in had stool that had dark red blood with it.  He reports he was not have any rectal pain and did not note having any internal or external hemorrhoids.  He was no  abdominal pain.  He reports on Friday night in the evening and then again at 11:00 p.m. he reports he was into the restroom and had bright red blood into the toilet mixed with stool.  He reports again no pain.  No external hemorrhoids.  No abdominal pain.  No nausea or vomiting.  He reports no weakness dizziness or lightheadedness.  No chest pain or shortness of breath.  He was not take stool softeners.  He reports he normally only has a bowel movement every several days and he was have to strain to use the restroom.  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  Reports no bowel movements since 11:00 p.m., 6 hours ago.  On physical exam patient was sleeping blood pressure 151/105 temp 98.3° pulse 62 sats 97% respirations 16 soft nontender abdomen normal cardiac and lung exam no pallor.  On anal exam no signs of fissure or external hemorrhoids on digital  Rectal exam no internal hemorrhoids.  That has no stool or blood on digital rectal exam.  MDM    Patient presents for emergent evaluation of acute blood in stool x3 began dark red and bright red last night and then 11:00 p.m. that poses a threat to life and/or bodily function.   Differential diagnosis includes but was not limited to diverticular bleed, colon polyps, malignancy, infectious diarrhea, all she was colitis or Crohn's, colitis, constipation, anal fissure, internal external hemorrhoids, abdominal aortic aneurysm.   In the ED patient found to have acute rectal bleeding iron-deficiency anemia.    I ordered labs and personally reviewed them.  Labs significant for see below    Discharge MDM     Patient was managed in the ED with no meds here will discharge home to continue taking he was iron supplements and antacids follow up with Dr. Awad .  He has been given strict return precautions for increasing blood in the stool, abdominal pain, or development of any symptomatic anemia  symptoms  The response to treatment was good.    Patient was discharged in stable condition.  Detailed return precautions discussed.  Patient was told to follow up with primary care physician or specialist based on their diagnosis  Sonia Melton MD      Amount and/or Complexity of Data Reviewed  Labs: ordered. Decision-making details documented in ED Course.               ED Course as of 11/09/24 0637   Sat Nov 09, 2024   0543 Patient was anemic with a H&H of 11.9 and 35 with a MCV of 77 but this is unchanged since August when he was 11 point 3 and 35.5.  Normal white count normal platelets     [RM]      ED Course User Index  [RM] Sonia Melotn MD                           Clinical Impression:  Final diagnoses:  [K62.5] Rectal bleeding (Primary)  [D50.9] Iron deficiency anemia, unspecified iron deficiency anemia type          ED Disposition Condition    Discharge Stable          ED Prescriptions    None       Follow-up Information       Follow up With Specialties Details Why Contact Info Additional Information    Linda Awad MD Gastroenterology Call  Call your gastroenterologist on Monday for follow up of stable rectal bleeding [address] [place] [address]  [id number]  [facility]  [place] [id number]  [phone]       [facility] - Emergency Dept Emergency Medicine Go to  If symptoms worsen- increased bright red blood per rectum, weakness, dizziness, lightheadedness, chest pain shortness of breath or abdominal pain [address] [facility] [id number] [phone] 1st floor             Sonia Melton MD  11/09/24 0637

## 2024-11-10 LAB
ALBUMIN SERPL BCP-MCNC: 4.1 G/DL (ref 3.5–5.2)
ALP SERPL-CCNC: 46 U/L (ref 55–135)
ALT SERPL W/O P-5'-P-CCNC: 7 U/L (ref 10–44)
ANION GAP SERPL CALC-SCNC: 9 MMOL/L (ref 8–16)
AST SERPL-CCNC: 10 U/L (ref 10–40)
BILIRUB SERPL-MCNC: 0.4 MG/DL (ref 0.1–1)
BUN SERPL-MCNC: 24 MG/DL (ref 6–20)
CALCIUM SERPL-MCNC: 9.4 MG/DL (ref 8.7–10.5)
CHLORIDE SERPL-SCNC: 109 MMOL/L (ref 95–110)
CO2 SERPL-SCNC: 24 MMOL/L (ref 23–29)
CREAT SERPL-MCNC: 1.8 MG/DL (ref 0.5–1.4)
EST. GFR  (NO RACE VARIABLE): 46.1 ML/MIN/1.73 M^2
GLUCOSE SERPL-MCNC: 95 MG/DL (ref 70–110)
POTASSIUM SERPL-SCNC: 4 MMOL/L (ref 3.5–5.1)
PROT SERPL-MCNC: 7 G/DL (ref 6–8.4)
SODIUM SERPL-SCNC: 142 MMOL/L (ref 136–145)